# Patient Record
Sex: FEMALE | Race: ASIAN | NOT HISPANIC OR LATINO | URBAN - METROPOLITAN AREA
[De-identification: names, ages, dates, MRNs, and addresses within clinical notes are randomized per-mention and may not be internally consistent; named-entity substitution may affect disease eponyms.]

---

## 2018-08-10 ENCOUNTER — EMERGENCY (EMERGENCY)
Facility: HOSPITAL | Age: 53
LOS: 1 days | Discharge: HOME | End: 2018-08-10
Admitting: EMERGENCY MEDICINE
Payer: OTHER MISCELLANEOUS

## 2018-08-10 VITALS
SYSTOLIC BLOOD PRESSURE: 119 MMHG | RESPIRATION RATE: 18 BRPM | HEART RATE: 83 BPM | TEMPERATURE: 98 F | DIASTOLIC BLOOD PRESSURE: 81 MMHG | OXYGEN SATURATION: 100 %

## 2018-08-10 PROCEDURE — 99282 EMERGENCY DEPT VISIT SF MDM: CPT

## 2018-08-10 RX ORDER — TETANUS TOXOID, REDUCED DIPHTHERIA TOXOID AND ACELLULAR PERTUSSIS VACCINE, ADSORBED 5; 2.5; 8; 8; 2.5 [IU]/.5ML; [IU]/.5ML; UG/.5ML; UG/.5ML; UG/.5ML
0.5 SUSPENSION INTRAMUSCULAR ONCE
Qty: 0 | Refills: 0 | Status: DISCONTINUED | OUTPATIENT
Start: 2018-08-10 | End: 2018-08-10

## 2018-08-10 NOTE — ED ADULT NURSE NOTE - NSIMPLEMENTINTERV_GEN_ALL_ED
Implemented All Universal Safety Interventions:  [place] to call system. Call bell, personal items and telephone within reach. Instruct patient to call for assistance. Room bathroom lighting operational. Non-slip footwear when patient is off stretcher. Physically safe environment: no spills, clutter or unnecessary equipment. Stretcher in lowest position, wheels locked, appropriate side rails in place.

## 2018-08-10 NOTE — ED ADULT TRIAGE NOTE - CHIEF COMPLAINT QUOTE
comes in for left head injury, states slammed her head onto the corner of a microwave cabinet in the hotel. Last tetanus was a month ago.. Denies LOC

## 2018-08-10 NOTE — ED PROVIDER NOTE - MEDICAL DECISION MAKING DETAILS
Scalp laceration s/p head injury w/o LOC or fall. No n/v. No focal deficits. No emergent indication for CT head. Tetanus utd. Will close w/ staples. Advised to f/u in 7 days for staple removal.

## 2018-08-10 NOTE — ED PROVIDER NOTE - PHYSICAL EXAMINATION
HEAD: 3 cm superficial laceration to L frontal scalp, oozing blood, clean, no foreign body, no bony tenderness or instability.     VITAL SIGNS: I have reviewed nursing notes and confirm.  CONSTITUTIONAL: Well-developed; well-nourished; in no acute distress.  SKIN: Skin is warm and dry, no acute rash.  HEAD: Normocephalic; atraumatic.  EYES: PERRL, EOM intact; conjunctiva and sclera clear.  ENT: No nasal discharge; airway clear.  NECK: Supple; non tender.  CARD: S1, S2 normal; no murmurs, gallops, or rubs. Regular rate and rhythm.  RESP: No wheezes, rales or rhonchi.  ABD: Normal bowel sounds; soft; non-distended; non-tender; no hepatosplenomegaly.  EXT: Normal ROM. No clubbing, cyanosis or edema.  NEURO: Patient is alert, oriented x person, place and time.  Cranial nerves 2-12 are intact.  Normal gait and speech.  Cerebellar testing normal:  negative Romberg, normal coordination and normal finger to nose, heal to shin and rapid alternating movements.  Normal proprioception and sensory exam.  No pronator drift.  5/5 bl upper extremity and lower extremity strength.   PSYCH: Cooperative, appropriate.

## 2018-08-10 NOTE — ED ADULT NURSE NOTE - OBJECTIVE STATEMENT
c/o left sided head injury s/p hitting head on corner yesterday night. denies LOC, use of blood thinners, dizziness. pt in NAD, resting comfortably and will continue to monitor. +tetanus UTD.

## 2018-08-10 NOTE — ED PROVIDER NOTE - OBJECTIVE STATEMENT
52 y/o F w/ PMH borderline NIDDM II on metformin and is visiting from [facility]. This morning at 6 am, she stood up quickly in kitchen and cut her L frontal scalp on edge of microwave. Denies fall or any LOC. Now presenting to ED w/ laceration to scalp. Denies headache, weakness, numbness, visual changes, gait/balance changes. Denies use of blood thinners. Tetanus is up to date, receive tetanus 1 month ago.

## 2018-08-10 NOTE — ED ADULT NURSE NOTE - CHPI ED NUR SYMPTOMS NEG
no loss of consciousness/no syncope/no weakness/no vomiting/no nausea/no confusion/no blurred vision/no dizziness/no change in level of consciousness/no seizure

## 2018-08-14 DIAGNOSIS — S01.01XA LACERATION WITHOUT FOREIGN BODY OF SCALP, INITIAL ENCOUNTER: ICD-10-CM

## 2018-08-14 DIAGNOSIS — E11.9 TYPE 2 DIABETES MELLITUS WITHOUT COMPLICATIONS: ICD-10-CM
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
## 2018-08-14 DIAGNOSIS — Z79.84 LONG TERM (CURRENT) USE OF ORAL HYPOGLYCEMIC DRUGS: ICD-10-CM
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
## 2021-06-04 NOTE — ED ADULT NURSE NOTE - CARDIO WDL
Is This A New Presentation, Or A Follow-Up?: Rash Normal rate, regular rhythm, normal S1, S2 heart sounds heard.